# Patient Record
Sex: MALE | Race: WHITE | Employment: OTHER | URBAN - METROPOLITAN AREA
[De-identification: names, ages, dates, MRNs, and addresses within clinical notes are randomized per-mention and may not be internally consistent; named-entity substitution may affect disease eponyms.]

---

## 2019-09-28 ENCOUNTER — HOSPITAL ENCOUNTER (EMERGENCY)
Facility: CLINIC | Age: 59
Discharge: HOME | End: 2019-09-28
Attending: INTERNAL MEDICINE | Admitting: INTERNAL MEDICINE
Payer: MEDICAID

## 2019-09-28 ENCOUNTER — APPOINTMENT (OUTPATIENT)
Dept: CT IMAGING | Facility: CLINIC | Age: 59
End: 2019-09-28
Attending: INTERNAL MEDICINE
Payer: MEDICAID

## 2019-09-28 VITALS
OXYGEN SATURATION: 91 % | SYSTOLIC BLOOD PRESSURE: 107 MMHG | HEART RATE: 96 BPM | RESPIRATION RATE: 13 BRPM | DIASTOLIC BLOOD PRESSURE: 82 MMHG | TEMPERATURE: 97.9 F

## 2019-09-28 DIAGNOSIS — S09.90XA INJURY OF HEAD, INITIAL ENCOUNTER: ICD-10-CM

## 2019-09-28 DIAGNOSIS — F10.929 ALCOHOLIC INTOXICATION WITH COMPLICATION (H): ICD-10-CM

## 2019-09-28 DIAGNOSIS — S01.81XA FACIAL LACERATION, INITIAL ENCOUNTER: ICD-10-CM

## 2019-09-28 LAB
ALBUMIN SERPL-MCNC: 3.2 G/DL (ref 3.4–5)
ALP SERPL-CCNC: 135 U/L (ref 40–150)
ALT SERPL W P-5'-P-CCNC: 93 U/L (ref 0–70)
ANION GAP SERPL CALCULATED.3IONS-SCNC: 9 MMOL/L (ref 3–14)
AST SERPL W P-5'-P-CCNC: 283 U/L (ref 0–45)
BASOPHILS # BLD AUTO: 0.1 10E9/L (ref 0–0.2)
BASOPHILS NFR BLD AUTO: 1.5 %
BILIRUB SERPL-MCNC: 0.7 MG/DL (ref 0.2–1.3)
BUN SERPL-MCNC: 5 MG/DL (ref 7–30)
CALCIUM SERPL-MCNC: 8.1 MG/DL (ref 8.5–10.1)
CHLORIDE SERPL-SCNC: 98 MMOL/L (ref 94–109)
CO2 SERPL-SCNC: 27 MMOL/L (ref 20–32)
CREAT SERPL-MCNC: 0.74 MG/DL (ref 0.66–1.25)
DIFFERENTIAL METHOD BLD: ABNORMAL
EOSINOPHIL # BLD AUTO: 0.1 10E9/L (ref 0–0.7)
EOSINOPHIL NFR BLD AUTO: 2 %
ERYTHROCYTE [DISTWIDTH] IN BLOOD BY AUTOMATED COUNT: 12.5 % (ref 10–15)
ETHANOL SERPL-MCNC: 0.45 G/DL
GFR SERPL CREATININE-BSD FRML MDRD: >90 ML/MIN/{1.73_M2}
GLUCOSE SERPL-MCNC: 122 MG/DL (ref 70–99)
HCT VFR BLD AUTO: 42.1 % (ref 40–53)
HGB BLD-MCNC: 14.9 G/DL (ref 13.3–17.7)
IMM GRANULOCYTES # BLD: 0 10E9/L (ref 0–0.4)
IMM GRANULOCYTES NFR BLD: 0.7 %
INR PPP: 1.04 (ref 0.86–1.14)
LYMPHOCYTES # BLD AUTO: 2.1 10E9/L (ref 0.8–5.3)
LYMPHOCYTES NFR BLD AUTO: 35.6 %
MCH RBC QN AUTO: 34.8 PG (ref 26.5–33)
MCHC RBC AUTO-ENTMCNC: 35.4 G/DL (ref 31.5–36.5)
MCV RBC AUTO: 98 FL (ref 78–100)
MONOCYTES # BLD AUTO: 0.4 10E9/L (ref 0–1.3)
MONOCYTES NFR BLD AUTO: 7.3 %
NEUTROPHILS # BLD AUTO: 3.1 10E9/L (ref 1.6–8.3)
NEUTROPHILS NFR BLD AUTO: 52.9 %
NRBC # BLD AUTO: 0 10*3/UL
NRBC BLD AUTO-RTO: 0 /100
PLATELET # BLD AUTO: 96 10E9/L (ref 150–450)
POTASSIUM SERPL-SCNC: 3.2 MMOL/L (ref 3.4–5.3)
PROT SERPL-MCNC: 7.9 G/DL (ref 6.8–8.8)
RBC # BLD AUTO: 4.28 10E12/L (ref 4.4–5.9)
SODIUM SERPL-SCNC: 134 MMOL/L (ref 133–144)
WBC # BLD AUTO: 5.9 10E9/L (ref 4–11)

## 2019-09-28 PROCEDURE — 80053 COMPREHEN METABOLIC PANEL: CPT | Performed by: INTERNAL MEDICINE

## 2019-09-28 PROCEDURE — 85610 PROTHROMBIN TIME: CPT | Performed by: INTERNAL MEDICINE

## 2019-09-28 PROCEDURE — 80320 DRUG SCREEN QUANTALCOHOLS: CPT | Performed by: INTERNAL MEDICINE

## 2019-09-28 PROCEDURE — 70450 CT HEAD/BRAIN W/O DYE: CPT

## 2019-09-28 PROCEDURE — 12054 INTMD RPR FACE/MM 7.6-12.5CM: CPT

## 2019-09-28 PROCEDURE — 85025 COMPLETE CBC W/AUTO DIFF WBC: CPT | Performed by: INTERNAL MEDICINE

## 2019-09-28 PROCEDURE — 99284 EMERGENCY DEPT VISIT MOD MDM: CPT | Mod: 25

## 2019-09-28 RX ORDER — LIDOCAINE HYDROCHLORIDE AND EPINEPHRINE 10; 10 MG/ML; UG/ML
INJECTION, SOLUTION INFILTRATION; PERINEURAL
Status: DISCONTINUED
Start: 2019-09-28 | End: 2019-09-28 | Stop reason: HOSPADM

## 2019-09-28 ASSESSMENT — ENCOUNTER SYMPTOMS
NECK PAIN: 0
WOUND: 1
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
## 2019-09-28 NOTE — ED NOTES
Bed: ED32  Expected date: 9/28/19  Expected time: 6:33 PM  Means of arrival: Ambulance  Comments:  BSV 4

## 2019-09-28 NOTE — ED PROVIDER NOTES
History     Chief Complaint:  Alcohol Intoxication and Head Laceration     HPI  Lui Golden is a 58 year old male who presents to the emergency department today with alcohol intoxication and head laceration. EMS states the patient hit his head falling against a brick wall while intoxicated and fell down the stairs. He had a syncopal episode and does not really remember what happened. He does have a laceration to the right side of his face. He denies dental or neck pain.    Allergies:  No Known Allergies     Medications:    No current outpatient medications on file.    Past Medical History:    No past medical history on file.    Past Surgical History:    No past surgical history on file.     Family History:    No family history on file.    Social History:  The patient was accompanied to the ED alone.    Review of Systems   HENT: Negative for dental problem.    Musculoskeletal: Negative for neck pain.   Skin: Positive for wound.   Neurological: Positive for syncope.        Physical Exam     Patient Vitals for the past 24 hrs:   BP Temp Temp src Pulse Heart Rate Resp SpO2   09/28/19 2045 107/82 -- -- 96 126 13 --   09/28/19 2030 119/80 -- -- 98 98 15 --   09/28/19 2015 120/79 -- -- 98 102 21 91 %   09/28/19 2000 121/84 -- -- 95 93 -- 97 %   09/28/19 1945 117/84 -- -- 93 95 24 94 %   09/28/19 1930 (!) 144/113 -- -- -- -- -- --   09/28/19 1915 -- -- -- -- -- -- 92 %   09/28/19 1900 -- -- -- 108 -- -- 98 %   09/28/19 1850 (!) 152/66 97.9  F (36.6  C) Oral 109 -- 18 97 %        Physical Exam  Constitutional:       Comments: Smells strongly of alcohol   HENT:      Head: Contusion present.        Comments: 7.6 cm lac right supraorbital     Right Ear: Tympanic membrane normal.      Left Ear: Tympanic membrane normal.      Mouth/Throat:      Pharynx: No posterior oropharyngeal erythema.   Eyes:      Conjunctiva/sclera: Conjunctivae normal.      Comments: Right eye closed due to hematoma, but able to open lids. Patient notes  normal vision.   Neck:      Musculoskeletal: Neck supple. No spinous process tenderness.   Cardiovascular:      Rate and Rhythm: Normal rate and regular rhythm.      Heart sounds: Normal heart sounds.   Pulmonary:      Effort: Pulmonary effort is normal.      Breath sounds: Normal breath sounds.   Abdominal:      General: Bowel sounds are normal. There is no distension.      Palpations: Abdomen is soft.      Tenderness: There is no tenderness. There is no guarding or rebound.   Musculoskeletal: Normal range of motion.   Skin:     General: Skin is warm and dry.   Neurological:      Mental Status: He is alert.           Emergency Department Course     Imaging:  Radiology findings were communicated with the patient who voiced understanding of the findings.    CT Head without contrast:    IMPRESSION:  1.  No acute intracranial process and no fractures. Soft tissue swelling right frontal and supraorbital scalp extends to the preseptal orbital level and across the midline. No retrobulbar edema. Please see above for details and description. Full bony   orbit not imaged as part of this study, as per radiology.    Laboratory:  Laboratory findings were communicated with the patient who voiced understanding of the findings.  ETOH: 0.45  CBC: WBC: 5.9, HGB: 14.9, PLT: 96 (L)  CMP: Glucose: 122 (H), BUN: 5 (L), Potassium: 3.2 (L), Calcium: 8.1 (L), Albumin 3.2 (L), ALT: 93 (H), AST: 283 (H), o/w WNL (Creatinine: 0.74)  INR: 1.04    Procedures:    Laceration Repair        LACERATION:  A  7.6 cm laceration.      LOCATION:  Right supraorbital           ANESTHESIA:  Local using Lidocaine 1% w/ epi total of 10 mLs      PREPARATION:  Irrigation with Normal Saline      DEBRIDEMENT:  no debridement      CLOSURE:  Wound was closed with Two Layers: Subcutaneous layer closed with a deep layer of 4-0 Chromic, followed by simple interrupted sutures of 5-0 Ethilon    Interventions:  1917 Lidocaine 1% w/ epi injection    Emergency Department  Course:  Nursing notes and vitals reviewed. (6:54 PM) I performed an exam of the patient as documented above.     IV inserted. Blood drawn. This was sent to the lab for further testing, results above.     Medicine administered as documented above.    The patient was sent for CT while in the emergency department, results above.     2003 I rechecked the patient and discussed the results of their workup thus far. A laceration procedure was performed as outlined in the procedure note above. The patient tolerated well and there were no complications.    I personally reviewed the workup results with the patient and answered all related questions prior to discharge. Patient discharged home with instructions regarding supportive care, medications, and reasons to return. The importance of close follow-up was reviewed.     Impression & Plan       Medical Decision Making:    Lui Golden is a 58 year old male who presents to the emergency department by ambulance after a fall with head injury.  He has a hematoma of the forehead with a fairly large laceration which was repaired as noted.  CT shows no evidence of intracranial bleed.  His alcohol level is quite elevated but family indicates that he is behaving as normal for him.  He has been awake and talkative without difficulty here.  No other evidence of traumatic injury.  We discussed his problem drinking.  He has been through.  Of sobriety before but is not interested in quitting right now.  His family is comfortable taking him home.  I will discharge them with head injury instructions, laceration instructions, stitches out in 8 to 10 days in clinic, return no signs of infection or other problems.        Diagnosis:    ICD-10-CM    1. Injury of head, initial encounter S09.90XA    2. Alcoholic intoxication with complication (H) F10.929    3. Facial laceration, initial encounter S01.81XA       Disposition:  Discharged to home.    Scribe Disclosure:  Marvel HURT, am  serving as a scribe at 6:54 PM on 9/28/2019 to document services personally performed by Daniella Foster MD based on my observations and the provider's statements to me.      9/28/2019   [facility] EMERGENCY DEPARTMENT       Daniella Foster MD  09/28/19 5898

## 2019-09-28 NOTE — ED AVS SNAPSHOT
[facility] Emergency Department  [address]  [facility] [address]  Phone:  [phone]  Fax:  [phone]                                    Lui Golden   MRN: [id number]    Department:  [facility] Emergency Department   Date of Visit:  9/28/2019           After Visit Summary Signature Page    I have received my discharge instructions, and my questions have been answered. I have discussed any challenges I see with this plan with the nurse or doctor.    ..........................................................................................................................................  Patient/Patient Representative Signature      ..........................................................................................................................................  Patient Representative Print Name and Relationship to Patient    ..................................................               ................................................  Date                                   Time    ..........................................................................................................................................  Reviewed by Signature/Title    ...................................................              ..............................................  Date                                               Time          22EPIC Rev 08/18

## 2019-09-29 NOTE — DISCHARGE INSTRUCTIONS
Discharge Instructions  Head Injury    You have been seen today for a head injury. You were checked for serious problems, like bleeding on the brain, but these problems cannot always be found right away.  Due to this risk, you should not be alone for 24 hours after your injury.  Follow up with your regular physician in 2 days. If you are taking a blood thinner, such as aspirin, Pradaxa  (dabigatran), Coumadin  (warfarin), or Plavix  (clopidogrel), you are at especially high risk for immediate or delayed bleeding, and need to re-check with a physician in 24 hours, or sooner if any of the symptoms below happen.     Return to the Emergency Department if:  You are confused, have amnesia, or you are not acting right.  Your headache gets worse or you start to have a really bad headache even with your recommended treatment plan.  You vomit more than once.  You have a convulsion or seizure.  You have trouble walking.  You have weakness or paralysis in an arm or a leg.  You have blood or fluid coming from your ears or nose.  You have new symptoms or anything that worries you.    Sleeping:  It is okay for you to sleep, but someone should wake you up as instructed by your doctor, and someone should check on you at your usual time to wake up.     Activity:  Do not drive for at least 24 hours.  Do not drive if you have dizzy spells or trouble concentrating, or remembering things.  Do not return to any contact sports until cleared by your regular doctor.     Follow-up:  It is very important that you make an appointment with your clinic and go to the appointment.  If you do not follow-up with your regular doctor, it may result in missing an important development which could result in permanent injury or disability and/or lasting pain.  If there is any problem keeping your appointment, call your doctor or return to the Emergency Department.    MORE INFORMATION:    Concussion:  A concussion is a minor head injury that may cause  temporary problems with the way your brain works.  Some symptoms include:  confusion, amnesia, nausea and vomiting, dizziness, fatigue, memory or concentration problems, irritability and sleep problems.    CT Scans: Your evaluation today may have included a CT scan (CAT scan) to look for things like bleeding or a skull fracture (break).  CT scans involve radiation and too many CT scans can cause serious health problems like cancer, especially in children.  Because of this, your doctor may not have ordered a CT scan today if they think you are at low risk for a serious or life threatening problem.    If you were given a prescription for medicine here today, be sure to read all of the information (including the package insert) that comes with your prescription.  This will include important information about the medicine, its side effects, and any warnings that you need to know about.  The pharmacist who fills the prescription can provide more information and answer questions you may have about the medicine.  If you have questions or concerns that the pharmacist cannot address, please call or return to the Emergency Department.     Opioid Medication Information    Pain medications are among the most commonly prescribed medicines, so we are including this information for all our patients. If you did not receive pain medication or get a prescription for pain medicine, you can ignore it.     You may have been given a prescription for an opioid (narcotic) pain medicine and/or have received a pain medicine while here in the Emergency Department. These medicines can make you drowsy or impaired. You must not drive, operate dangerous equipment, or engage in any other dangerous activities while taking these medications. If you drive while taking these medications, you could be arrested for DUI, or driving under the influence. Do not drink any alcohol while you are taking these medications.     Opioid pain medications can cause  addiction. If you have a history of chemical dependency of any type, you are at a higher risk of becoming addicted to pain medications.  Only take these prescribed medications to treat your pain when all other options have been tried. Take it for as short a time and as few doses as possible. Store your pain pills in a secure place, as they are frequently stolen and provide a dangerous opportunity for children or visitors in your house to start abusing these powerful medications. We will not replace any lost or stolen medicine.  As soon as your pain is better, you should flush all your remaining medication.     Many prescription pain medications contain Tylenol  (acetaminophen), including Vicodin , Tylenol #3 , Norco , Lortab , and Percocet .  You should not take any extra pills of Tylenol  if you are using these prescription medications or you can get very sick.  Do not ever take more than 3000 mg of acetaminophen in any 24 hour period.    All opioids tend to cause constipation. Drink plenty of water and eat foods that have a lot of fiber, such as fruits, vegetables, prune juice, apple juice and high fiber cereal.  Take a laxative if you don t move your bowels at least every other day. Miralax , Milk of Magnesia, Colace , or Senna  can be used to keep you regular.      Remember that you can always come back to the Emergency Department if you are not able to see your regular doctor in the amount of time listed above, if you get any new symptoms, or if there is anything that worries you.         Discharge Instructions  Laceration (Cut)    You were seen today for a laceration (cut).  Your doctor examined your laceration for any problems such a buried foreign body (like glass, a splinter, or gravel), or injury to blood vessels, tendons, and nerves.  Your doctor may have also rinsed and/or scrubbed your laceration to help prevent an infection.  Your laceration may have been closed with glue, staples or sutures  (stitches).      It may not be possible to find all problems with your laceration on the first visit, and we can't always prevent infections.  Antibiotics are only given when the benefit is more than the risk, and don't prevent all infections. Some lacerations are too high risk to close, and are left open to heal.  All lacerations, no matter how expertly repaired, will cause scarring.    Return to the Emergency Department right away if:  You have more redness, swelling, pain, drainage (pus), a bad smell, or red streaking from your laceration.    You have a fever of 101oF or more.  You have bleeding that you can t stop at home. If your cut starts to bleed, hold pressure on the bleeding area with a clean cloth or put pressure over the bandage.  If the bleeding doesn t stop after using constant pressure for 30 minutes, you should return to the Emergency Department for further treatment.  An area past the laceration is cool, pale, or blue compared with the other side, or has a slower return of color when squeezed.  Your dressing seems too tight or starts to get uncomfortable or painful.  You have loss of normal function or use of an area, such as being unable to straighten or bend a finger normally.  You have a numb area past the laceration.    Return to the Emergency Department or see your regular doctor if:  The laceration starts to come open.   You have something coming out of the cut or a feeling that there is something in the laceration.  Your wound will not heal, or keeps breaking open. There can always be glass, wood, dirt or other things in any wound.  They won t always show up, even on x-rays.  If a wound doesn t heal, this may be why, and it is important to follow-up with your regular doctor.    Home Care:  Take your dressing off in 12 hours, or as instructed by your doctor, to check your laceration. Remove the dressing sooner if it seems too tight or painful, or if it is getting numb, tingly, or pale past the  "dressing.  Gently wash your laceration 2 times a day with clean cloth and soap.   It is okay to shower, but do not let the laceration soak in water.    If your laceration was closed with wound adhesive or strips: pat it dry and leave it open to the air.   For all other repairs: after you wash your laceration, or at least 2 times a day, apply bacitracin or other antibiotic ointment to the laceration, then cover it with a Band-Aid  or gauze.  Keep the laceration clean. Wear gloves or other protective clothing if you are around dirt.    Follow-up:  You need to follow-up with your regular doctor in 2 days.  Your sutures or staples need to be removed in 10 days. Schedule an appointment with your regular doctor to have this done.    Scars:  To help minimize scarring:  Wear sunscreen over the healed laceration when out in the sun.  Massage the area regularly.  You may use Vitamin E oil.  Wait a year.  Most scars will start to fade within a year.    Probiotics: If you have been given an antibiotic, you may want to also take a probiotic pill or eat yogurt with live cultures. Probiotics have \"good bacteria\" to help your intestines stay healthy. Studies have shown that probiotics help prevent diarrhea and other intestine problems (including C. diff infection) when you take antibiotics. You can buy these without a prescription in the pharmacy section of the store.     If you were given a prescription for medicine here today, be sure to read all of the information (including the package insert) that comes with your prescription.  This will include important information about the medicine, its side effects, and any warnings that you need to know about.  The pharmacist who fills the prescription can provide more information and answer questions you may have about the medicine.  If you have questions or concerns that the pharmacist cannot address, please call or return to the Emergency Department.     Opioid Medication " Information    Pain medications are among the most commonly prescribed medicines, so we are including this information for all our patients. If you did not receive pain medication or get a prescription for pain medicine, you can ignore it.     You may have been given a prescription for an opioid (narcotic) pain medicine and/or have received a pain medicine while here in the Emergency Department. These medicines can make you drowsy or impaired. You must not drive, operate dangerous equipment, or engage in any other dangerous activities while taking these medications. If you drive while taking these medications, you could be arrested for DUI, or driving under the influence. Do not drink any alcohol while you are taking these medications.     Opioid pain medications can cause addiction. If you have a history of chemical dependency of any type, you are at a higher risk of becoming addicted to pain medications.  Only take these prescribed medications to treat your pain when all other options have been tried. Take it for as short a time and as few doses as possible. Store your pain pills in a secure place, as they are frequently stolen and provide a dangerous opportunity for children or visitors in your house to start abusing these powerful medications. We will not replace any lost or stolen medicine.  As soon as your pain is better, you should flush all your remaining medication.     Many prescription pain medications contain Tylenol  (acetaminophen), including Vicodin , Tylenol #3 , Norco , Lortab , and Percocet .  You should not take any extra pills of Tylenol  if you are using these prescription medications or you can get very sick.  Do not ever take more than 3000 mg of acetaminophen in any 24 hour period.    All opioids tend to cause constipation. Drink plenty of water and eat foods that have a lot of fiber, such as fruits, vegetables, prune juice, apple juice and high fiber cereal.  Take a laxative if you don t  move your bowels at least every other day. Miralax , Milk of Magnesia, Colace , or Senna  can be used to keep you regular.      Remember that you can always come back to the Emergency Department if you are not able to see your regular doctor in the amount of time listed above, if you get any new symptoms, or if there is anything that worries you.

## 2019-09-29 NOTE — ED NOTES
"Additional support to pressure bandage applied due to increased bleeding. Friend/sig other arrival who was with pt. Pt converse easily wsith friends, who report this is his \"normal\" , he does not appear confused to them. Sent to ct via cart.   "

## 2019-09-29 NOTE — ED NOTES
Report rec'd and cares assumed. MD at [facility], significant bleeding from wound over R eye. Pressure bandage applied . Pt prepared for ct scan. Alert and confused, slurring words. Airway, breathing intact

## 2021-05-29 ENCOUNTER — HOSPITAL ENCOUNTER (EMERGENCY)
Facility: CLINIC | Age: 61
Discharge: HOME | End: 2021-05-29
Attending: EMERGENCY MEDICINE | Admitting: EMERGENCY MEDICINE
Payer: COMMERCIAL

## 2021-05-29 ENCOUNTER — APPOINTMENT (OUTPATIENT)
Dept: CT IMAGING | Facility: CLINIC | Age: 61
End: 2021-05-29
Attending: EMERGENCY MEDICINE
Payer: COMMERCIAL

## 2021-05-29 VITALS
DIASTOLIC BLOOD PRESSURE: 76 MMHG | OXYGEN SATURATION: 96 % | TEMPERATURE: 98.6 F | RESPIRATION RATE: 16 BRPM | SYSTOLIC BLOOD PRESSURE: 115 MMHG | HEART RATE: 86 BPM

## 2021-05-29 DIAGNOSIS — S09.90XA CLOSED HEAD INJURY, INITIAL ENCOUNTER: ICD-10-CM

## 2021-05-29 DIAGNOSIS — F10.220 ACUTE ALCOHOLIC INTOXICATION IN ALCOHOLISM WITHOUT COMPLICATION (H): ICD-10-CM

## 2021-05-29 DIAGNOSIS — S01.81XA FACIAL LACERATION, INITIAL ENCOUNTER: ICD-10-CM

## 2021-05-29 LAB
ALBUMIN SERPL-MCNC: 3.1 G/DL (ref 3.4–5)
ALP SERPL-CCNC: 95 U/L (ref 40–150)
ALT SERPL W P-5'-P-CCNC: 65 U/L (ref 0–70)
ANION GAP SERPL CALCULATED.3IONS-SCNC: 5 MMOL/L (ref 3–14)
AST SERPL W P-5'-P-CCNC: 103 U/L (ref 0–45)
BASOPHILS # BLD AUTO: 0.2 10E9/L (ref 0–0.2)
BASOPHILS NFR BLD AUTO: 2.1 %
BILIRUB SERPL-MCNC: 0.2 MG/DL (ref 0.2–1.3)
BUN SERPL-MCNC: 6 MG/DL (ref 7–30)
CALCIUM SERPL-MCNC: 8.3 MG/DL (ref 8.5–10.1)
CHLORIDE SERPL-SCNC: 104 MMOL/L (ref 94–109)
CO2 SERPL-SCNC: 27 MMOL/L (ref 20–32)
CREAT SERPL-MCNC: 0.68 MG/DL (ref 0.66–1.25)
DIFFERENTIAL METHOD BLD: ABNORMAL
EOSINOPHIL # BLD AUTO: 0.2 10E9/L (ref 0–0.7)
EOSINOPHIL NFR BLD AUTO: 1.8 %
ERYTHROCYTE [DISTWIDTH] IN BLOOD BY AUTOMATED COUNT: 14.1 % (ref 10–15)
ETHANOL SERPL-MCNC: 0.38 G/DL
GFR SERPL CREATININE-BSD FRML MDRD: >90 ML/MIN/{1.73_M2}
GLUCOSE SERPL-MCNC: 98 MG/DL (ref 70–99)
HCT VFR BLD AUTO: 41.3 % (ref 40–53)
HGB BLD-MCNC: 13.5 G/DL (ref 13.3–17.7)
IMM GRANULOCYTES # BLD: 0.1 10E9/L (ref 0–0.4)
IMM GRANULOCYTES NFR BLD: 1.1 %
LYMPHOCYTES # BLD AUTO: 1.5 10E9/L (ref 0.8–5.3)
LYMPHOCYTES NFR BLD AUTO: 17.1 %
MCH RBC QN AUTO: 33.5 PG (ref 26.5–33)
MCHC RBC AUTO-ENTMCNC: 32.7 G/DL (ref 31.5–36.5)
MCV RBC AUTO: 103 FL (ref 78–100)
MONOCYTES # BLD AUTO: 1.4 10E9/L (ref 0–1.3)
MONOCYTES NFR BLD AUTO: 16.4 %
NEUTROPHILS # BLD AUTO: 5.2 10E9/L (ref 1.6–8.3)
NEUTROPHILS NFR BLD AUTO: 61.5 %
NRBC # BLD AUTO: 0 10*3/UL
NRBC BLD AUTO-RTO: 0 /100
PLATELET # BLD AUTO: 312 10E9/L (ref 150–450)
POTASSIUM SERPL-SCNC: 3.6 MMOL/L (ref 3.4–5.3)
PROT SERPL-MCNC: 7.6 G/DL (ref 6.8–8.8)
RBC # BLD AUTO: 4.03 10E12/L (ref 4.4–5.9)
SODIUM SERPL-SCNC: 136 MMOL/L (ref 133–144)
WBC # BLD AUTO: 8.5 10E9/L (ref 4–11)

## 2021-05-29 PROCEDURE — 93005 ELECTROCARDIOGRAM TRACING: CPT

## 2021-05-29 PROCEDURE — 82077 ASSAY SPEC XCP UR&BREATH IA: CPT | Performed by: EMERGENCY MEDICINE

## 2021-05-29 PROCEDURE — 12011 RPR F/E/E/N/L/M 2.5 CM/<: CPT

## 2021-05-29 PROCEDURE — 72125 CT NECK SPINE W/O DYE: CPT

## 2021-05-29 PROCEDURE — 85025 COMPLETE CBC W/AUTO DIFF WBC: CPT | Performed by: EMERGENCY MEDICINE

## 2021-05-29 PROCEDURE — 96360 HYDRATION IV INFUSION INIT: CPT

## 2021-05-29 PROCEDURE — 80053 COMPREHEN METABOLIC PANEL: CPT | Performed by: EMERGENCY MEDICINE

## 2021-05-29 PROCEDURE — 99285 EMERGENCY DEPT VISIT HI MDM: CPT | Mod: 25

## 2021-05-29 PROCEDURE — 258N000003 HC RX IP 258 OP 636: Performed by: EMERGENCY MEDICINE

## 2021-05-29 PROCEDURE — 70450 CT HEAD/BRAIN W/O DYE: CPT

## 2021-05-29 RX ORDER — LIDOCAINE HYDROCHLORIDE 10 MG/ML
INJECTION, SOLUTION INFILTRATION; PERINEURAL
Status: DISCONTINUED
Start: 2021-05-29 | End: 2021-05-29 | Stop reason: HOSPADM

## 2021-05-29 RX ADMIN — SODIUM CHLORIDE 1000 ML: 9 INJECTION, SOLUTION INTRAVENOUS at 12:11

## 2021-05-29 ASSESSMENT — ENCOUNTER SYMPTOMS
ABDOMINAL PAIN: 0
WOUND: 1
NAUSEA: 0
ARTHRALGIAS: 0
NECK PAIN: 0
VOMITING: 0

## 2021-05-29 NOTE — ED NOTES
ED signout note    Signed out pending metabolization of alcohol and sober ride.     Mental status cleared as expected, able find a sober ride and dc'd home.     MD Joselo Moses Jerome Richard, MD  05/30/21 1588

## 2021-05-29 NOTE — ED NOTES
Pt had a c-collar with ems but refused to wear it and tore it off.  Pt refuses to leave collar on at ed.

## 2021-05-29 NOTE — ED NOTES
Bed: ED06  Expected date: 5/29/21  Expected time: 11:30 AM  Means of arrival: Ambulance  Comments:  bv2
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
## 2021-06-01 LAB — INTERPRETATION ECG - MUSE: NORMAL

## 2021-09-05 ENCOUNTER — HOSPITAL ENCOUNTER (EMERGENCY)
Facility: CLINIC | Age: 61
Discharge: HOME | End: 2021-09-06
Attending: EMERGENCY MEDICINE | Admitting: EMERGENCY MEDICINE
Payer: COMMERCIAL

## 2021-09-05 DIAGNOSIS — F10.920 ALCOHOLIC INTOXICATION WITHOUT COMPLICATION (H): ICD-10-CM

## 2021-09-05 PROCEDURE — 99283 EMERGENCY DEPT VISIT LOW MDM: CPT

## 2021-09-05 ASSESSMENT — MIFFLIN-ST. JEOR: SCORE: 2038.23

## 2021-09-05 NOTE — ED NOTES
Bed: ED06  Expected date:   Expected time:   Means of arrival:   Comments:  [facility] 59yo M etoh   No
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
## 2021-09-05 NOTE — ED NOTES
Urinated t/o room while standing at bedside, holding urinal at side shortly after arrival.  Assisted with urinal second time up at side of bed.  Is refusing IV, lab draw or fluids at this time.

## 2021-09-05 NOTE — ED NOTES
Box lunch, juice and ice water provided.  Continues to refuse lab draw, fluids, cardiac monitoring. Has been up urinating x 4 thus far.

## 2021-09-05 NOTE — ED PROVIDER NOTES
"  History   Chief Complaint:  Alcohol Intoxication       The history is provided by the patient.      Lui Golden is a 60 year old male with history of Alcohol abuse who presents with Alcohol intoxication. Per the nursing staff, the patient was found on the back steps of a restaurant. He was talking to himself, incoherently, and refused to leave the premises, so the restaurant staff called EMS. He needed assistance to get on the stretcher and he blew a 0.3, but it is unclear if drugs are involved. En route to the ER, he was talking and repeating statements to himself. His heart rate was in the 90s and blood pressure was 140/90.    Per the patient, he admits to drinking Vodka. Denies drug use. Otherwise, he has no complaints.    Review of Systems   Psychiatric/Behavioral:        Alcohol Intoxication     All other systems reviewed and are negative.    Allergies:  No Known Allergies    Medications:  None     Past Medical History:    Alcohol abuse     Social History:  PCP: [facility], [facility]  Arrival via EMS.  He lives with his son who is mentally handicapped, therefore he cannot pick him up.     Physical Exam     Patient Vitals for the past 24 hrs:   BP Temp Temp src Pulse Resp SpO2 Height Weight   09/05/21 2235 116/82 -- -- -- 18 98 % -- --   09/05/21 1539 -- -- -- -- -- 97 % -- --   09/05/21 1537 117/69 -- -- 92 -- -- -- --   09/05/21 1528 (!) 136/94 97.5  F (36.4  C) Oral 91 18 97 % 1.854 m (6' 1\") 117.9 kg (260 lb)       Physical Exam  General: disheveled, intoxicated, incontinent of urine.   HENT:  No trauma.   Eyes: EOMI. Normal conjunctiva.  Neck:  Normal range of motion and appearance.   Cardiovascular:  Normal rate, regular rhythm and normal heart sounds.   Pulmonary/Chest:  Effort normal. No wheezing or crackles.  Abdominal: Soft. No distension or tenderness.     Musculoskeletal: Normal range of motion. No edema or tenderness.   Neurological: oriented, normal strength, sensation, and " coordination.   Skin: Warm and dry. No rash or bruising.   Psychiatric: intoxicated.       Emergency Department Course     Emergency Department Course:    Reviewed:  I reviewed nursing notes, vitals and past medical history    Assessments:  1513 I obtained history and examined the patient as noted above.       Disposition:  The patient was discharged to home.     Impression & Plan     Medical Decision Makin-year-old male arrives via EMS acutely intoxicated but cooperative.  He admits to drinking vodka denies coingestants or any physical complaints.  He is disheveled and incontinent of urine.  He has had a prolonged period of observation during which time he has become increasingly sober as expected.  Will be signed out to my colleague with plans for eventual discharge home via cab or uber as he has no sober ride available to come get him.    Diagnosis:    ICD-10-CM    1. Alcoholic intoxication without complication (H)  F10.920          Scribe Disclosure:  I, Cinthya Marcano, am serving as a scribe at 3:09 PM on 2021 to document services personally performed by Sanchez Young MD based on my observations and the provider's statements to me.        Sanchez Young MD  10/01/21 1025

## 2021-09-06 VITALS
HEART RATE: 98 BPM | DIASTOLIC BLOOD PRESSURE: 83 MMHG | OXYGEN SATURATION: 94 % | SYSTOLIC BLOOD PRESSURE: 131 MMHG | HEIGHT: 73 IN | RESPIRATION RATE: 18 BRPM | WEIGHT: 260 LBS | BODY MASS INDEX: 34.46 KG/M2 | TEMPERATURE: 97.5 F

## 2021-09-06 NOTE — ED NOTES
Pt attempting to get out of bed and RN to room to assist. Pt had urinated on self, bed and floor. Pt cleaned, clean linen and clothing provided to pt.

## 2021-09-06 NOTE — ED NOTES
"Pt called RN into room and stating he wants to go home, reiterated to pt that he needs to get a sober ride or be clinically sober to go home. Pt also requesting sandwich, has a box at [facility]de and this was indicated to the patient who replied \"Whatever\".    "

## 2021-09-06 NOTE — ED NOTES
Patient ambulated independently with steady gait out of and back into his room. Able to stand and use urinal independently.

## 2021-09-06 NOTE — ED NOTES
Patient still refusing all care. Cooperative. Denies needs at this time. Patient still unable to find a ride to take him home.

## 2021-09-06 NOTE — ED PROVIDER NOTES
Received sign out from Dr. Young to reeval on clinical sobriety   Now clinically sober, awake and alert, normal gait, no slurred speech. Denies suicidal ideation and appropriate for discharge      Randy Patricio MD  09/06/21 0155

## 2021-09-06 NOTE — ED NOTES
Provided with paper scrubs to go home.  Given mask and phone numbers for taxi cabs to call. Escorted to the lobby

## 2023-01-25 NOTE — ED PROVIDER NOTES
"  History   Chief Complaint:  Fall     The history is provided by the patient and the EMS personnel. The history is limited by the condition of the patient.      Lui Golden is a 60 year old male with history of alcohol intoxication who presents with fall. EMS reports that the patient was found in the parking lot of the bar he was drinking at prior to his fall. He had tripped and lacerated his right eyebrow and forehead. He has been seen in the past for an exact situation in 2019. His last tetanus was in 03/2012. He reports that he is unsure how much he drank today but that he likes beer and vodka. 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 He denies neck pain, abdominal pain, arm pain, dental issues, or new swelling of his lower extremities.  He reports that he has been to detox but that he does not want to go there.       Review of Systems   Unable to perform ROS: Other   Cardiovascular: Negative for leg swelling.   Gastrointestinal: Negative for abdominal pain, nausea and vomiting.   Musculoskeletal: Negative for arthralgias and neck pain.   Skin: Positive for wound (of the right eye brow and forehead).   All other systems reviewed and are negative.  Patient is intoxicated ROS and history limited.       Allergies:  No known drug allergies     Medications:  The patient is not currently taking any prescribed medications.     Past Medical History:    Alcohol abuse     Social History:  Alcohol use: yes, unsure how much  PCP: Rubén Boyd  Presents to the ED via EMS    Physical Exam     Patient Vitals for the past 24 hrs:   BP Temp Pulse Resp SpO2   05/29/21 1315 -- -- 82 -- 94 %   05/29/21 1230 -- -- 87 -- 97 %   05/29/21 1215 117/85 -- 63 -- 93 %   05/29/21 1200 117/89 -- 94 -- 96 %   05/29/21 1149 115/84 98.6  F (37  C) 92 16 98 %       Physical Exam  General: Elderly male laying on the stretcher  Eyes: PERRL, Conjunctive within normal limits. Unable to cooperate with EOM " testing.  HENT: No scalp hematoma or tenderness.  No facial tenderness.  No palpable crepitus.  Moist mucous membranes, oropharynx clear.  Poor dentition.  Neck: Nontender.  Normal spontaneous range of motion.  CV: Normal S1S2, no murmur, rub or gallop. Regular rate and rhythm  Resp: Clear to auscultation bilaterally, no wheezes, rales or rhonchi. Normal respiratory effort.  GI: Abdomen is soft, nontender and nondistended. No palpable masses. No rebound or guarding.  MSK: 1+ pitting edema bilateral lower extremities. Nontender. Normal active range of motion.  Skin: Warm and dry.  1.5 cm curvilinear subcutaneous gaping laceration in the right eyebrow.  Normal active bleeding.  Superficial abrasions over the right elbow and left knee.  No rashes or lesions or ecchymoses on visible skin.  Neuro: Alert and oriented.  Slurred speech.  Responds appropriately to all questions and commands. No focal findings appreciated. Normal muscle tone.  Psych: Normal mood and affect.       Emergency Department Course     ECG:  ECG taken at 1213, ECG read at 1215  Normal sinus rhythm  Normal ECG  Rate 85 bpm. ID interval 188 ms. QRS duration 88 ms. QT/QTc 382/454 ms. P-R-T axes 56 -21 28.    Imaging:  Head CT:  1. No evidence of acute intracranial hemorrhage, mass, or herniation.   2. Mild diffuse parenchymal volume loss and white matter changes   likely due to chronic microvascular ischemic disease.   3. Right frontal scalp soft tissue injury. No underlying calvarial   fracture appreciated.    Reading per radiology     Cervical spine CT:  1. No evidence of acute intracranial hemorrhage, mass, or herniation.   2. Mild diffuse parenchymal volume loss and white matter changes   likely due to chronic microvascular ischemic disease.   3. Right frontal scalp soft tissue injury. No underlying calvarial   fracture appreciated.     Reading per radiology     Laboratory:  CBC: WBC 8.5, HGB 13.5,    CMP: BUN 6(L), Calcium 8.3(L), Albumin  3.1(L), o/w WNL (Creatinine: 0.68)    Alcohol ethyl: 0.38(H)    Procedures    Laceration Repair        LACERATION:  A superficial clean 1.5 cm laceration.      LOCATION:  Right eyebrow and forehead      FUNCTION:  Distally sensation, circulation, motor and tendon function are intact.      ANESTHESIA:  Local using lidocaine 1% total of 2 mLs      PREPARATION:  Irrigation with Normal Saline      DEBRIDEMENT:  wound explored, no foreign body found. There was old stiches present near the laceration from previous repair. These were removed.       CLOSURE:  Wound was closed with One Layer.  Skin closed with 3. x 4.0 Ethylon using interrupted sutures.       Emergency Department Course:  Reviewed:  I reviewed the patient's nursing notes, vitals, past medical records, Care Everywhere.     Assessments:  1147 I performed an assessment and examination of the patient as noted above.      1234 I performed a laceration repair, as noted above.  I removed old sutures that were in place over a well-healed scar just medial and inferior to the right eyebrow.    1400 The patient was signed out to Dr. Josue, oncoming ED physician, pending sober evaluation.     Interventions:  1211 NS 1L IV Bolus     Disposition:  Care of the patient was transferred to my colleague Dr. Josue pending sober evaluation.       Impression & Plan   Medical Decision Making:  Lui Golden is a 60 year old male who presents for evaluation following a head injury with alcohol intoxication.  Laceration was repair as above with sutures.  The differential diagnosis includes skull fracture, epidural hematoma, subdural hematoma, intracerebral hemorrhage, and traumatic subarachnoid hemorrhage.  There are no physical signs of skull fracture or other bony fracture on exam and the patient is well-appearing, but given the patients age intoxication and the unreliable physical exam at this age, a CT of the head and neck was indicated.  Fortunately, no signs of  intracerebral bleed or skull fracture were detected during this visit on CT imaging.  CT neck was limited by patient movement but when the patient is more sober he will be reassessed by Dr. Josue.  They are intoxicated here in ED by lab evaluation.  Patient has no history of DTs or alcohol withdrawal seizures. There are no signs of co-ingestion including acetaminophen, drugs, medications, volatile alcohols.  Alcohol counseling provided by myself and patient does not want treatment resources.  Patient was signed out to Dr. Josue pending sober evaluation.     Covid-19  Lui Golden was evaluated during a global COVID-19 pandemic, which necessitated consideration that the patient might be at risk for infection with the SARS-CoV-2 virus that causes COVID-19.   Applicable protocols for evaluation were followed during the patient's care.   COVID-19 was considered as part of the patient's evaluation. The plan for testing is:  a test was obtained during this visit.    Diagnosis:    ICD-10-CM    1. Acute alcoholic intoxication in alcoholism without complication (H)  F10.220    2. Closed head injury, initial encounter  S09.90XA    3. Facial laceration, initial encounter  S01.81XA        Scribe Disclosure:  Jerri HURT, am serving as a scribe at 11:52 AM on 5/29/2021 to document services personally performed by Adelaida Hall MD based on my observations and the provider's statements to me.        Adelaida Hall MD  05/30/21 2607     [At Term] : at term [Normal Vaginal Route] : by normal vaginal route [None] : there were no delivery complications [Age Appropriate] : age appropriate developmental milestones met [FreeTextEntry1] : 6 lb 11 oz